# Patient Record
(demographics unavailable — no encounter records)

---

## 2025-07-11 NOTE — PHYSICAL EXAM
[Appropriately responsive] : appropriately responsive [Alert] : alert [No Acute Distress] : no acute distress [Soft] : soft [Non-tender] : non-tender [Examination Of The Breasts] : a normal appearance [No Masses] : no breast masses were palpable [Labia Majora] : normal [Labia Minora] : normal [Normal] : normal [Uterine Adnexae] : non-palpable [FreeTextEntry6] : sp left breast lumpectomy and radiation [Tenderness] : nontender [Enlarged ___ wks] : not enlarged [Mass ___ cm] : no uterine mass was palpated [No Tenderness] : no tenderness [FreeTextEntry5] : pap done [FreeTextEntry9] : guaiac-

## 2025-07-11 NOTE — HISTORY OF PRESENT ILLNESS
[No] : Patient does not have concerns regarding sex [Currently Active] : currently active [Men] : men [Vaginal] : vaginal [TextBox_4] : Hx of left Breast cancer sp  neoadjuvant chemo followed by left breast lumpectomy and radiation.  No vb, has osteoporosis, saw jesusita, plan is to start on reclast. She just moved to [place] and is going to find someone there to treat her. takes vit d level normal, exercises C/o vaginal dryness despite using lubricant and desires estrogen cream for this. [Mammogramdate] : 10/2024 [PapSmeardate] : 1/2022 [BoneDensityDate] : 2/2024 [ColonoscopyDate] : 12/2023 [Yes] : Patient has concerns regarding sex [FreeTextEntry1] : uses lubricant, doesnt help anymore